# Patient Record
Sex: MALE
[De-identification: names, ages, dates, MRNs, and addresses within clinical notes are randomized per-mention and may not be internally consistent; named-entity substitution may affect disease eponyms.]

---

## 2020-01-15 ENCOUNTER — APPOINTMENT (OUTPATIENT)
Dept: OTOLARYNGOLOGY | Facility: CLINIC | Age: 55
End: 2020-01-15
Payer: COMMERCIAL

## 2020-01-15 VITALS
DIASTOLIC BLOOD PRESSURE: 79 MMHG | BODY MASS INDEX: 25.92 KG/M2 | HEART RATE: 53 BPM | HEIGHT: 69 IN | WEIGHT: 175 LBS | SYSTOLIC BLOOD PRESSURE: 117 MMHG

## 2020-01-15 DIAGNOSIS — H93.13 TINNITUS, BILATERAL: ICD-10-CM

## 2020-01-15 DIAGNOSIS — H93.293 OTHER ABNORMAL AUDITORY PERCEPTIONS, BILATERAL: ICD-10-CM

## 2020-01-15 DIAGNOSIS — Z87.891 PERSONAL HISTORY OF NICOTINE DEPENDENCE: ICD-10-CM

## 2020-01-15 DIAGNOSIS — Z78.9 OTHER SPECIFIED HEALTH STATUS: ICD-10-CM

## 2020-01-15 DIAGNOSIS — H90.5 UNSPECIFIED SENSORINEURAL HEARING LOSS: ICD-10-CM

## 2020-01-15 PROBLEM — Z00.00 ENCOUNTER FOR PREVENTIVE HEALTH EXAMINATION: Status: ACTIVE | Noted: 2020-01-15

## 2020-01-15 PROCEDURE — 92557 COMPREHENSIVE HEARING TEST: CPT

## 2020-01-15 PROCEDURE — 92550 TYMPANOMETRY & REFLEX THRESH: CPT

## 2020-01-15 PROCEDURE — 99203 OFFICE O/P NEW LOW 30 MIN: CPT

## 2020-01-15 NOTE — ASSESSMENT
[FreeTextEntry1] : Today's audiometry was essentially within normal limits. A borderline high-frequency hearing losses noted in the left ear.\par \par I have carefully reviewed the etiologies of tinnitus with the patient and have reviewed management options including coping strategies.  I have offered a referral to an audiologist for further evaluation and counseling.\par \par Annual follow up with audiometric surveillance recommended. Early follow up recommended if symptoms progress.

## 2020-01-15 NOTE — HISTORY OF PRESENT ILLNESS
[de-identified] : TAWANNA ALLEN has a history of tinnitus in both ear for about 2-3 months. Spontaneous onset with no apparent trigger.  High pitched non pulsatile tone. Mild perceived hearing loss for 1 year or more.  No known noise exposure. \par No prior ear disease.\par No family history of premature hearing loss

## 2020-01-15 NOTE — CONSULT LETTER
[Please see my note below.] : Please see my note below. [FreeTextEntry2] : Dear XIN RAE  [FreeTextEntry1] : Thank you for allowing me to participate in the care of TAWANNA ALLEN .\par Please see the attached visit note.\par \par \par \par Reji Carrera\par Otology\par Department of Otolaryngology\par [facility]